# Patient Record
Sex: FEMALE | NOT HISPANIC OR LATINO | Employment: STUDENT | URBAN - METROPOLITAN AREA
[De-identification: names, ages, dates, MRNs, and addresses within clinical notes are randomized per-mention and may not be internally consistent; named-entity substitution may affect disease eponyms.]

---

## 2017-02-06 ENCOUNTER — NON-PROVIDER VISIT (OUTPATIENT)
Dept: URGENT CARE | Facility: CLINIC | Age: 22
End: 2017-02-06

## 2017-02-06 DIAGNOSIS — Z00.8 HEALTH EXAMINATION IN POPULATION SURVEY: ICD-10-CM

## 2017-02-06 DIAGNOSIS — Z02.1 PRE-EMPLOYMENT DRUG SCREENING: ICD-10-CM

## 2017-02-06 LAB
AMP AMPHETAMINE: NORMAL
COC COCAINE: NORMAL
INT CON NEG: NORMAL
INT CON POS: NORMAL
MET METHAMPHETAMINES: NORMAL
OPI OPIATES: NORMAL
PCP PHENCYCLIDINE: NORMAL
POC DRUG COMMENT 753798-OCCUPATIONAL HEALTH: NORMAL
THC: NORMAL

## 2017-02-06 PROCEDURE — 80305 DRUG TEST PRSMV DIR OPT OBS: CPT | Performed by: PHYSICIAN ASSISTANT

## 2017-07-11 ENCOUNTER — OFFICE VISIT (OUTPATIENT)
Dept: URGENT CARE | Facility: CLINIC | Age: 22
End: 2017-07-11
Payer: COMMERCIAL

## 2017-07-11 VITALS
RESPIRATION RATE: 16 BRPM | DIASTOLIC BLOOD PRESSURE: 82 MMHG | OXYGEN SATURATION: 95 % | SYSTOLIC BLOOD PRESSURE: 118 MMHG | TEMPERATURE: 98.1 F | HEART RATE: 102 BPM | WEIGHT: 180 LBS | HEIGHT: 64 IN | BODY MASS INDEX: 30.73 KG/M2

## 2017-07-11 DIAGNOSIS — J02.9 EXUDATIVE PHARYNGITIS: ICD-10-CM

## 2017-07-11 LAB
INT CON NEG: NEGATIVE
INT CON POS: POSITIVE
S PYO AG THROAT QL: NORMAL

## 2017-07-11 PROCEDURE — 99214 OFFICE O/P EST MOD 30 MIN: CPT | Performed by: NURSE PRACTITIONER

## 2017-07-11 PROCEDURE — 87880 STREP A ASSAY W/OPTIC: CPT | Performed by: NURSE PRACTITIONER

## 2017-07-11 RX ORDER — AMOXICILLIN 400 MG/5ML
500 POWDER, FOR SUSPENSION ORAL 2 TIMES DAILY
Qty: 126 ML | Refills: 0 | Status: SHIPPED | OUTPATIENT
Start: 2017-07-11 | End: 2017-07-21

## 2017-07-11 ASSESSMENT — ENCOUNTER SYMPTOMS
SORE THROAT: 1
HEADACHES: 0
CHILLS: 0
VOMITING: 0
NAUSEA: 0
MYALGIAS: 1
SHORTNESS OF BREATH: 0
WEAKNESS: 0
SWOLLEN GLANDS: 1
SPUTUM PRODUCTION: 0
DIARRHEA: 0
COUGH: 0
STRIDOR: 0
FEVER: 1
TROUBLE SWALLOWING: 1

## 2017-07-11 NOTE — MR AVS SNAPSHOT
"Sharmin Duval   2017 9:00 AM   Office Visit   MRN: [id number]    Department:  [facility]   Dept Phone:  [phone]    Description:  Female : 1995   Provider:  LILA Smiley           Reason for Visit     Pharyngitis hurts to swallow and white spots      Allergies as of 2017     Allergen Noted Reactions    No Known Drug Allergy 2014       Vicodin [Hydrocodone-Acetaminophen] 2017       \"kept passing out\"      You were diagnosed with     Exudative pharyngitis   [517954]         Vital Signs     Blood Pressure Pulse Temperature Respirations Height Weight    118/82 mmHg 102 36.7 °C (98.1 °F) 16 1.626 m (5' 4\") 81.647 kg (180 lb)    Body Mass Index Oxygen Saturation Breastfeeding? Smoking Status          30.88 kg/m2 95% No Never Smoker         Basic Information     Date Of Birth Sex Race Ethnicity Preferred Language    1995 Female White Non- English      Problem List              ICD-10-CM Priority Class Noted - Resolved    Seasonal allergies J30.2   2014 - Present    Thalassemia minor D56.3   Unknown - Present    Sinus pain J34.89   6/10/2015 - Present    Lump in neck R22.1   10/21/2015 - Present    Anxiety F41.9   2016 - Present    Serum gamma globulin increased D89.2   2016 - Present    Vitamin D deficiency E55.9   2016 - Present      Health Maintenance        Date Due Completion Dates    IMM PNEUMOCOCCAL 19-64 (ADULT) HIGHEST RISK SERIES (1 of 3 - PCV13) 10/11/2012 ---    IMM INFLUENZA (1) 2017 10/29/2015    PAP SMEAR 6/10/2018 6/10/2015 (Postponed)    Override on 6/10/2015: Postponed (never sexually active)    IMM DTaP/Tdap/Td Vaccine (7 - Td) 6/10/2025 6/10/2015, 2007, 1997, 1995, 1994, 3/10/1994, 1993            Results     POCT Rapid Strep A      Component    Rapid Strep Screen    NEG    Internal Control Positive    Positive    Internal Control Negative    Negative                        "   Current Immunizations     Dtap Vaccine 9/19/1997, 2/1/1995, 5/19/1994, 3/10/1994, 1993    HPV 9-VALENT VACCINE (GARDASIL 9) 10/23/2006    HPV Quadrivalent Vaccine (GARDASIL) 6/22/2006, 4/24/2006    Hepatitis A Vaccine, Ped/Adol 11/5/2003, 4/22/2003    Hepatitis B Vaccine Non-Recombivax (Ped/Adol) 11/5/2003, 6/2/2003, 4/22/2003    Hib Vaccine (Prp-d) Historical Data 2/1/1995, 5/19/1994, 3/10/1994, 1993    IPV 2/1/1995, 5/19/1994, 3/10/1994, 1993    Influenza Vaccine Quad Inj (Preserved) 10/29/2015    MMR Vaccine 9/19/1997, 2/1/1995    Meningococcal Conjugate Vaccine MCV4 (Menactra) 4/16/2007    OPV - Historical Data 9/19/1997    Tdap Vaccine 6/10/2015, 4/16/2007    Varicella Vaccine Live 10/14/1996, 1/1/1995      Below and/or attached are the medications your provider expects you to take. Review all of your home medications and newly ordered medications with your provider and/or pharmacist. Follow medication instructions as directed by your provider and/or pharmacist. Please keep your medication list with you and share with your provider. Update the information when medications are discontinued, doses are changed, or new medications (including over-the-counter products) are added; and carry medication information at all times in the event of emergency situations     Allergies:  NO KNOWN DRUG ALLERGY - (reactions not documented)     VICODIN - (reactions not documented)               Medications  Valid as of: July 11, 2017 -  1:37 PM    Generic Name Brand Name Tablet Size Instructions for use    Amoxicillin (Recon Susp) AMOXIL 400 MG/5ML Take 6.3 mL by mouth 2 times a day for 10 days.        Ergocalciferol (Cap) DRISDOL 99352 UNITS Take 1 Cap by mouth every 7 days.        Fluticasone Propionate (Suspension) FLONASE 50 MCG/ACT Spray 1 Spray in nose every day.        Loratadine (Tab) CLARITIN 10 MG Take one tablet by mouth one time daily        Norethin-Eth Estrad-Fe Biphas   Take  by mouth.        .                  Medicines prescribed today were sent to:     [id number] [place] [facility] [place] - [address]    [address] [place] [address]    Phone: [phone] Fax: [phone]    Open 24 Hours?: No      Medication refill instructions:       If your prescription bottle indicates you have medication refills left, it is not necessary to call your provider’s office. Please contact your pharmacy and they will refill your medication.    If your prescription bottle indicates you do not have any refills left, you may request refills at any time through one of the following ways: The online [employer] Mail system (except Urgent Care), by calling your provider’s office, or by asking your pharmacy to contact your provider’s office with a refill request. Medication refills are processed only during regular business hours and may not be available until the next business day. Your provider may request additional information or to have a follow-up visit with you prior to refilling your medication.   *Please Note: Medication refills are assigned a new Rx number when refilled electronically. Your pharmacy may indicate that no refills were authorized even though a new prescription for the same medication is available at the pharmacy. Please request the medicine by name with the pharmacy before contacting your provider for a refill.        Instructions    Pharyngitis  Pharyngitis is redness, pain, and swelling (inflammation) of your pharynx.   CAUSES   Pharyngitis is usually caused by infection. Most of the time, these infections are from viruses (viral) and are part of a cold. However, sometimes pharyngitis is caused by bacteria (bacterial). Pharyngitis can also be caused by allergies. Viral pharyngitis may be spread from person to person by coughing, sneezing, and personal items or utensils (cups, forks, spoons, toothbrushes). Bacterial pharyngitis may be spread from person to person by more intimate contact, such as  kissing.   SIGNS AND SYMPTOMS   Symptoms of pharyngitis include:    · Sore throat.    · Tiredness (fatigue).    · Low-grade fever.    · Headache.  · Joint pain and muscle aches.  · Skin rashes.  · Swollen lymph nodes.  · Plaque-like film on throat or tonsils (often seen with bacterial pharyngitis).  DIAGNOSIS   Your health care provider will ask you questions about your illness and your symptoms. Your medical history, along with a physical exam, is often all that is needed to diagnose pharyngitis. Sometimes, a rapid strep test is done. Other lab tests may also be done, depending on the suspected cause.   TREATMENT   Viral pharyngitis will usually get better in 3-4 days without the use of medicine. Bacterial pharyngitis is treated with medicines that kill germs (antibiotics).   HOME CARE INSTRUCTIONS   · Drink enough water and fluids to keep your urine clear or pale yellow.    · Only take over-the-counter or prescription medicines as directed by your health care provider:    ¨ If you are prescribed antibiotics, make sure you finish them even if you start to feel better.    ¨ Do not take aspirin.    · Get lots of rest.    · Gargle with 8 oz of salt water (½ tsp of salt per 1 qt of water) as often as every 1-2 hours to soothe your throat.    · Throat lozenges (if you are not at risk for choking) or sprays may be used to soothe your throat.  SEEK MEDICAL CARE IF:   · You have large, tender lumps in your neck.  · You have a rash.  · You cough up green, yellow-brown, or bloody spit.  SEEK IMMEDIATE MEDICAL CARE IF:   · Your neck becomes stiff.  · You drool or are unable to swallow liquids.  · You vomit or are unable to keep medicines or liquids down.  · You have severe pain that does not go away with the use of recommended medicines.  · You have trouble breathing (not caused by a stuffy nose).  MAKE SURE YOU:   · Understand these instructions.  · Will watch your condition.  · Will get help right away if you are not doing  well or get worse.     This information is not intended to replace advice given to you by your health care provider. Make sure you discuss any questions you have with your health care provider.     Document Released: 12/18/2006 Document Revised: 10/08/2014 Document Reviewed: 08/25/2014  [employer] Interactive Patient Education ©2016 [employer]            [employer] Access Code: Activation code not generated  Current Online Agility Status: Active

## 2017-07-11 NOTE — PROGRESS NOTES
"Subjective:      Sharmin Duval is a 21 y.o. female who presents with Pharyngitis            HPI Comments: Medications, Allergies and Prior Medical Hx reviewed and updated in [facility].with patient/family today         Pharyngitis   This is a new problem. The current episode started yesterday. The problem has been gradually worsening. The pain is worse on the left side. Maximum temperature: subjective fever. The pain is moderate. Associated symptoms include swollen glands and trouble swallowing. Pertinent negatives include no congestion, coughing, diarrhea, ear discharge, ear pain, headaches, shortness of breath, stridor or vomiting. She has had exposure to strep. She has tried nothing for the symptoms. The treatment provided no relief.       Review of Systems   Constitutional: Positive for fever and malaise/fatigue. Negative for chills.   HENT: Positive for sore throat and trouble swallowing. Negative for congestion, ear discharge and ear pain.    Respiratory: Negative for cough, sputum production, shortness of breath and stridor.    Gastrointestinal: Negative for nausea, vomiting and diarrhea.   Musculoskeletal: Positive for myalgias.   Neurological: Negative for weakness and headaches.          Objective:     /82 mmHg  Pulse 102  Temp(Src) 36.7 °C (98.1 °F)  Resp 16  Ht 1.626 m (5' 4\")  Wt 81.647 kg (180 lb)  BMI 30.88 kg/m2  SpO2 95%  Breastfeeding? No     Physical Exam   Constitutional: She appears well-developed and well-nourished. No distress.   HENT:   Head: Normocephalic and atraumatic.   Right Ear: Tympanic membrane and ear canal normal.   Left Ear: Tympanic membrane and ear canal normal.   Nose: No rhinorrhea.   Mouth/Throat: Uvula is midline and mucous membranes are normal. No trismus in the jaw. No uvula swelling. Oropharyngeal exudate, posterior oropharyngeal edema and posterior oropharyngeal erythema present.   Eyes: Conjunctivae are normal. Pupils are equal, round, and reactive to light. "   Neck: Neck supple.   Cardiovascular: Normal rate, regular rhythm and normal heart sounds.    Pulmonary/Chest: Effort normal and breath sounds normal. No respiratory distress. She has no wheezes.   Lymphadenopathy:     She has cervical adenopathy.   Neurological: She is alert.   Skin: Skin is warm and dry.   Psychiatric: She has a normal mood and affect. Her behavior is normal.   Vitals reviewed.              Assessment/Plan:       1. Exudative pharyngitis  POCT Rapid Strep A    amoxicillin (AMOXIL) 400 MG/5ML suspension         poct strep - neg    Pt sx are compatible with strep, she has been exposed to strep discussed with pt she agrees to treat for strep.     Letter written for 2 days off of school/work    Epic generated written imformation provided along with verbal instructions for pharyngitis    Rest, Fluids, tylenol, ibuprofen, otc throat lozenges, gargle with warm salt water,   Pt will go to the ER for worsening or changing symptoms as discussed,  Follow-up with your primary care provider or return here if not improving in 5 days   Discharge instructions discussed with pt/family who verbalize understanding and agreement with poc

## 2017-07-11 NOTE — PATIENT INSTRUCTIONS

## 2018-10-29 ENCOUNTER — IMMUNIZATION (OUTPATIENT)
Dept: SOCIAL WORK | Facility: CLINIC | Age: 23
End: 2018-10-29
Payer: COMMERCIAL

## 2018-10-29 DIAGNOSIS — Z23 NEED FOR VACCINATION: ICD-10-CM

## 2018-10-29 PROCEDURE — 90471 IMMUNIZATION ADMIN: CPT | Performed by: REGISTERED NURSE

## 2018-10-29 PROCEDURE — 90686 IIV4 VACC NO PRSV 0.5 ML IM: CPT | Performed by: REGISTERED NURSE

## 2019-03-13 ENCOUNTER — OFFICE VISIT (OUTPATIENT)
Dept: MEDICAL GROUP | Facility: LAB | Age: 24
End: 2019-03-13
Payer: COMMERCIAL

## 2019-03-13 VITALS
HEIGHT: 64 IN | TEMPERATURE: 98 F | RESPIRATION RATE: 20 BRPM | OXYGEN SATURATION: 93 % | WEIGHT: 197.09 LBS | BODY MASS INDEX: 33.65 KG/M2 | HEART RATE: 113 BPM | DIASTOLIC BLOOD PRESSURE: 68 MMHG | SYSTOLIC BLOOD PRESSURE: 126 MMHG

## 2019-03-13 DIAGNOSIS — D56.3 THALASSEMIA MINOR: ICD-10-CM

## 2019-03-13 DIAGNOSIS — R78.89 FINDING OF OTHER SPECIFIED SUBSTANCES, NOT NORMALLY FOUND IN BLOOD: ICD-10-CM

## 2019-03-13 DIAGNOSIS — F41.1 GAD (GENERALIZED ANXIETY DISORDER): ICD-10-CM

## 2019-03-13 DIAGNOSIS — J30.2 SEASONAL ALLERGIES: ICD-10-CM

## 2019-03-13 PROCEDURE — 99204 OFFICE O/P NEW MOD 45 MIN: CPT | Performed by: FAMILY MEDICINE

## 2019-03-13 ASSESSMENT — PATIENT HEALTH QUESTIONNAIRE - PHQ9: CLINICAL INTERPRETATION OF PHQ2 SCORE: 0

## 2019-03-13 NOTE — PATIENT INSTRUCTIONS
Generalized Anxiety Disorder  Generalized anxiety disorder (ISABELLE) is a mental disorder. It interferes with life functions, including relationships, work, and school.  ISABELLE is different from normal anxiety, which everyone experiences at some point in their lives in response to specific life events and activities. Normal anxiety actually helps us prepare for and get through these life events and activities. Normal anxiety goes away after the event or activity is over.   ISABELLE causes anxiety that is not necessarily related to specific events or activities. It also causes excess anxiety in proportion to specific events or activities. The anxiety associated with ISABELLE is also difficult to control. ISABELLE can vary from mild to severe. People with severe ISABELLE can have intense waves of anxiety with physical symptoms (panic attacks).   SYMPTOMS  The anxiety and worry associated with ISABELLE are difficult to control. This anxiety and worry are related to many life events and activities and also occur more days than not for 6 months or longer. People with ISABELLE also have three or more of the following symptoms (one or more in children):  · Restlessness.    · Fatigue.  · Difficulty concentrating.    · Irritability.  · Muscle tension.  · Difficulty sleeping or unsatisfying sleep.  DIAGNOSIS  ISABELLE is diagnosed through an assessment by your health care provider. Your health care provider will ask you questions about your mood, physical symptoms, and events in your life. Your health care provider may ask you about your medical history and use of alcohol or drugs, including prescription medicines. Your health care provider may also do a physical exam and blood tests. Certain medical conditions and the use of certain substances can cause symptoms similar to those associated with ISABELLE. Your health care provider may refer you to a mental health specialist for further evaluation.  TREATMENT  The following therapies are usually used to treat ISABELLE:    · Medication. Antidepressant medication usually is prescribed for long-term daily control. Antianxiety medicines may be added in severe cases, especially when panic attacks occur.    · Talk therapy (psychotherapy). Certain types of talk therapy can be helpful in treating ISABELLE by providing support, education, and guidance. A form of talk therapy called cognitive behavioral therapy can teach you healthy ways to think about and react to daily life events and activities.  · Stress management techniques. These include yoga, meditation, and exercise and can be very helpful when they are practiced regularly.  A mental health specialist can help determine which treatment is best for you. Some people see improvement with one therapy. However, other people require a combination of therapies.  This information is not intended to replace advice given to you by your health care provider. Make sure you discuss any questions you have with your health care provider.  Document Released: 04/14/2014 Document Revised: 01/08/2016 Document Reviewed: 04/14/2014  [employer] Interactive Patient Education © 2017 [employer]

## 2019-03-13 NOTE — ASSESSMENT & PLAN NOTE
Saw a doctor for her anxiety and they did a lot of labs and was told she had cancer which she saw a hematologist and she does not have cancer.  She see's Dr. Sarah Cardona with [facility]

## 2019-03-13 NOTE — PROGRESS NOTES
Chief Complaint   Patient presents with   • Establish Care         Sharmin Mir is a 23 y.o. female here to establish care and for evaluation and management of:        HPI:    Seasonal allergies  More in the Spring with dry eyes and congestion.    IASBELLE (generalized anxiety disorder)  Saw a doctor for her anxiety and they did a lot of labs and was told she had cancer which she saw a hematologist and she does not have cancer.  She see's Dr. Sarah Card[facility] with [facility]    Patient is followed by Dr. Cardona at [facility].  She was originally referred for a possible multiple myeloma but this was found to be inaccurate.  She is followed annually.  We are waiting to get her old labs to see exactly what condition she has.  She has been doing well but this scare has increased her overall anxiety and she would like to see a psychologist.    Allergies   Allergen Reactions   • Latex Rash     Rash   • Vicodin [Hydrocodone-Acetaminophen]      Pt states she passed out after taking the medication       Current medicines (including changes today)  No current outpatient prescriptions on file.     No current facility-administered medications for this visit.      She  has a past medical history of Allergy; Anxiety; and Thalassemia minor (3/13/2019).  She  has a past surgical history that includes dental extraction(s).  Social History   Substance Use Topics   • Smoking status: Never Smoker   • Smokeless tobacco: Never Used   • Alcohol use No     Social History     Social History Narrative   • No narrative on file     Family History   Problem Relation Age of Onset   • Cancer Paternal Aunt 49        Breast   • Cancer Maternal Grandmother 58        colon     Family Status   Relation Status   • Mo Alive   • Fa Alive   • Bro Alive   • PAunt Alive   • MGMo    • MGFa    • PGMo    • PGFa          ROS  No fever or chills.  No nausea or vomiting.  No chest pain or palpitations.  No cough  "or SOB.  No pain with urination or hematuria.  No black or bloody stools.  All other systems reviewed and are negative     Objective:     Blood pressure 126/68, pulse (!) 113, temperature 36.7 °C (98 °F), temperature source Temporal, resp. rate 20, height 1.63 m (5' 4.17\"), weight 89.4 kg (197 lb 1.5 oz), last menstrual period 02/10/2019, SpO2 93 %. Body mass index is 33.65 kg/m².  Physical Exam:      Well developed, well nourished.  Alert, oriented in no acute distress.  Psych: Eye contact is good, speech goal directed, affect calm  Eyes: conjunctiva non-injected, sclera non-icteric.  Neck Supple.  No adenopathy or masses in the neck or supraclavicular regions. No thyromegaly  Lungs: clear to auscultation bilaterally with good excursion. No wheezes or rhonchi  CV: regular rate and rhythm. No murmur  Abdomen: soft, nontender, no masses or organomegaly.  No rebound or guarding  Ext: no edema, color normal, vascularity normal, temperature normal      Assessment and Plan:   The following treatment plan was discussed    1. Thalassemia minor  Reassured.  Follow-up with hematology  - REFERRAL TO HEMATOLOGY ONCOLOGY Referral to? [facility]    2. Seasonal allergies  Symptomatic care.  Discussed avoidance and using OTC medications like Allegra or Claritin    3. ISABELLE (generalized anxiety disorder)  Refer to psychology for further evaluation and treatment.  I did discuss mindfulness meditation and martha such as headspace in the science behind this.  - REFERRAL TO PSYCHOLOGY    4. Finding of other specified substances, not normally found in blood  Refer to Dr. Cardona for further evaluation and treatment.  We will get a copy of those records for her chart.  - REFERRAL TO HEMATOLOGY ONCOLOGY Referral to? [facility]      Records requested.    Any change or worsening of signs or symptoms, patient encouraged to follow-up or report to the emergency room for further evaluation. Patient understands and " agrees.    Followup: Return in about 6 months (around 9/13/2019).

## 2019-04-18 ENCOUNTER — APPOINTMENT (OUTPATIENT)
Dept: LAB | Facility: MEDICAL CENTER | Age: 24
End: 2019-04-18
Attending: INTERNAL MEDICINE
Payer: COMMERCIAL

## 2019-04-19 ENCOUNTER — HOSPITAL ENCOUNTER (OUTPATIENT)
Dept: LAB | Facility: MEDICAL CENTER | Age: 24
End: 2019-04-19
Attending: INTERNAL MEDICINE
Payer: COMMERCIAL

## 2019-04-19 LAB
ALBUMIN SERPL BCP-MCNC: 4.3 G/DL (ref 3.2–4.9)
ALBUMIN/GLOB SERPL: 1.2 G/DL
ALP SERPL-CCNC: 71 U/L (ref 30–99)
ALT SERPL-CCNC: 16 U/L (ref 2–50)
ANION GAP SERPL CALC-SCNC: 9 MMOL/L (ref 0–11.9)
AST SERPL-CCNC: 17 U/L (ref 12–45)
BILIRUB SERPL-MCNC: 0.7 MG/DL (ref 0.1–1.5)
BUN SERPL-MCNC: 12 MG/DL (ref 8–22)
CALCIUM SERPL-MCNC: 9.4 MG/DL (ref 8.5–10.5)
CHLORIDE SERPL-SCNC: 104 MMOL/L (ref 96–112)
CO2 SERPL-SCNC: 24 MMOL/L (ref 20–33)
CREAT SERPL-MCNC: 0.66 MG/DL (ref 0.5–1.4)
FASTING STATUS PATIENT QL REPORTED: NORMAL
GLOBULIN SER CALC-MCNC: 3.6 G/DL (ref 1.9–3.5)
GLUCOSE SERPL-MCNC: 90 MG/DL (ref 65–99)
POTASSIUM SERPL-SCNC: 3.7 MMOL/L (ref 3.6–5.5)
PROT SERPL-MCNC: 7.9 G/DL (ref 6–8.2)
SODIUM SERPL-SCNC: 137 MMOL/L (ref 135–145)

## 2019-04-19 PROCEDURE — 82784 ASSAY IGA/IGD/IGG/IGM EACH: CPT

## 2019-04-19 PROCEDURE — 82232 ASSAY OF BETA-2 PROTEIN: CPT

## 2019-04-19 PROCEDURE — 84160 ASSAY OF PROTEIN ANY SOURCE: CPT

## 2019-04-19 PROCEDURE — 84156 ASSAY OF PROTEIN URINE: CPT

## 2019-04-19 PROCEDURE — 80053 COMPREHEN METABOLIC PANEL: CPT

## 2019-04-19 PROCEDURE — 83883 ASSAY NEPHELOMETRY NOT SPEC: CPT | Mod: 91

## 2019-04-19 PROCEDURE — 36415 COLL VENOUS BLD VENIPUNCTURE: CPT

## 2019-04-19 PROCEDURE — 85025 COMPLETE CBC W/AUTO DIFF WBC: CPT

## 2019-04-19 PROCEDURE — 84165 PROTEIN E-PHORESIS SERUM: CPT

## 2019-04-21 LAB
B2 MICROGLOB SERPL-MCNC: 1.3 MG/L (ref 1.1–2.4)
IGA SERPL-MCNC: 131 MG/DL (ref 68–408)
IGG SERPL-MCNC: 1600 MG/DL (ref 768–1632)
IGM SERPL-MCNC: 111 MG/DL (ref 35–263)

## 2019-04-22 LAB
ALBUMIN 24H UR QL ELPH: DETECTED
ALBUMIN SERPL-MCNC: 4.47 G/DL (ref 3.75–5.01)
ALPHA1 GLOB 24H UR QL ELPH: DETECTED
ALPHA1 GLOB SERPL ELPH-MCNC: 0.29 G/DL (ref 0.19–0.46)
ALPHA2 GLOB 24H UR QL ELPH: DETECTED
ALPHA2 GLOB SERPL ELPH-MCNC: 0.58 G/DL (ref 0.48–1.05)
B-GLOBULIN SERPL ELPH-MCNC: 0.81 G/DL (ref 0.48–1.1)
B-GLOBULIN UR QL ELPH: DETECTED
COLLECT DURATION TIME SPEC: ABNORMAL H
EER PROT ELECT SER Q1092: ABNORMAL
GAMMA GLOB SERPL ELPH-MCNC: 1.85 G/DL (ref 0.62–1.51)
GAMMA GLOB UR QL ELPH: DETECTED
INTERPRETATION SERPL IFE-IMP: ABNORMAL
INTERPRETATION UR IFE-IMP: ABNORMAL
KAPPA LC FREE SER-MCNC: 1.88 MG/DL (ref 0.33–1.94)
KAPPA LC FREE UR-MCNC: 2.1 MG/DL (ref 0.14–2.42)
KAPPA LC FREE/LAMBDA FREE SER NEPH: 1.59 {RATIO} (ref 0.26–1.65)
KAPPA LC FREE/LAMBDA FREE UR: 17.5 RATIO (ref 2.04–10.37)
LAMBDA LC FREE SERPL-MCNC: 1.18 MG/DL (ref 0.57–2.63)
LAMBDA LC FREE UR-MCNC: 0.12 MG/DL (ref 0.02–0.67)
PROT 24H UR-MRATE: ABNORMAL MG/D (ref 10–140)
PROT SERPL-MCNC: 8 G/DL (ref 6–8.3)
TOTAL VOLUME 1105: ABNORMAL ML

## 2019-09-04 ENCOUNTER — HOSPITAL ENCOUNTER (OUTPATIENT)
Dept: LAB | Facility: MEDICAL CENTER | Age: 24
End: 2019-09-04
Attending: SPECIALIST
Payer: COMMERCIAL

## 2019-09-04 PROCEDURE — 87491 CHLMYD TRACH DNA AMP PROBE: CPT

## 2019-09-04 PROCEDURE — 87591 N.GONORRHOEAE DNA AMP PROB: CPT

## 2019-09-05 LAB
C TRACH DNA SPEC QL NAA+PROBE: NEGATIVE
N GONORRHOEA DNA SPEC QL NAA+PROBE: NEGATIVE
SPECIMEN SOURCE: NORMAL

## 2019-10-30 ENCOUNTER — IMMUNIZATION (OUTPATIENT)
Dept: SOCIAL WORK | Facility: CLINIC | Age: 24
End: 2019-10-30
Payer: COMMERCIAL

## 2019-10-30 DIAGNOSIS — Z23 NEED FOR VACCINATION: ICD-10-CM

## 2019-10-30 PROCEDURE — 90686 IIV4 VACC NO PRSV 0.5 ML IM: CPT | Performed by: REGISTERED NURSE

## 2019-10-30 PROCEDURE — 90471 IMMUNIZATION ADMIN: CPT | Performed by: REGISTERED NURSE

## 2024-01-08 ENCOUNTER — HOSPITAL ENCOUNTER (OUTPATIENT)
Facility: MEDICAL CENTER | Age: 29
End: 2024-01-08
Attending: PREVENTIVE MEDICINE
Payer: COMMERCIAL

## 2024-01-08 ENCOUNTER — EMPLOYEE HEALTH (OUTPATIENT)
Dept: OCCUPATIONAL MEDICINE | Facility: CLINIC | Age: 29
End: 2024-01-08

## 2024-01-08 ENCOUNTER — EH NON-PROVIDER (OUTPATIENT)
Dept: OCCUPATIONAL MEDICINE | Facility: CLINIC | Age: 29
End: 2024-01-08

## 2024-01-08 DIAGNOSIS — Z02.89 VISIT FOR OCCUPATIONAL HEALTH EXAMINATION: ICD-10-CM

## 2024-01-08 DIAGNOSIS — Z02.89 VISIT FOR OCCUPATIONAL HEALTH EXAMINATION: Primary | ICD-10-CM

## 2024-01-08 LAB
AMP AMPHETAMINE: NORMAL
BAR BARBITURATES: NORMAL
BZO BENZODIAZEPINES: NORMAL
COC COCAINE: NORMAL
INT CON NEG: NORMAL
INT CON POS: NORMAL
MDMA ECSTASY: NORMAL
MET METHAMPHETAMINES: NORMAL
MTD METHADONE: NORMAL
OPI OPIATES: NORMAL
OXY OXYCODONE: NORMAL
PCP PHENCYCLIDINE: NORMAL
POC URINE DRUG SCREEN OCDRS: NORMAL
THC: NORMAL

## 2024-01-08 PROCEDURE — 8915 PR COMPREHENSIVE PHYSICAL: Performed by: PREVENTIVE MEDICINE

## 2024-01-08 PROCEDURE — 80305 DRUG TEST PRSMV DIR OPT OBS: CPT | Performed by: PREVENTIVE MEDICINE

## 2024-01-08 PROCEDURE — 86480 TB TEST CELL IMMUN MEASURE: CPT | Performed by: PREVENTIVE MEDICINE

## 2024-01-08 NOTE — PROGRESS NOTES
Pre Employment Drug Screen Completed.   No mask fit required due to REGGIE and Job position.  Docs: Hep B, MMR, VZV, 2015 Tdap  Declined Flu & COVID

## 2024-01-09 LAB
GAMMA INTERFERON BACKGROUND BLD IA-ACNC: 0.07 IU/ML
M TB IFN-G BLD-IMP: NEGATIVE
M TB IFN-G CD4+ BCKGRND COR BLD-ACNC: 0.01 IU/ML
MITOGEN IGNF BCKGRD COR BLD-ACNC: >10 IU/ML
QFT TB2 - NIL TBQ2: 0.02 IU/ML

## 2024-12-01 ENCOUNTER — APPOINTMENT (OUTPATIENT)
Dept: RADIOLOGY | Facility: MEDICAL CENTER | Age: 29
End: 2024-12-01
Attending: EMERGENCY MEDICINE
Payer: COMMERCIAL

## 2024-12-01 ENCOUNTER — HOSPITAL ENCOUNTER (EMERGENCY)
Facility: MEDICAL CENTER | Age: 29
End: 2024-12-01
Attending: EMERGENCY MEDICINE
Payer: COMMERCIAL

## 2024-12-01 VITALS
TEMPERATURE: 97.9 F | HEART RATE: 110 BPM | RESPIRATION RATE: 16 BRPM | OXYGEN SATURATION: 95 % | DIASTOLIC BLOOD PRESSURE: 84 MMHG | SYSTOLIC BLOOD PRESSURE: 137 MMHG

## 2024-12-01 DIAGNOSIS — H53.9 VISION DISTURBANCE: ICD-10-CM

## 2024-12-01 LAB
ANION GAP SERPL CALC-SCNC: 16 MMOL/L (ref 7–16)
BASOPHILS # BLD AUTO: 0.2 % (ref 0–1.8)
BASOPHILS # BLD: 0.03 K/UL (ref 0–0.12)
BUN SERPL-MCNC: 8 MG/DL (ref 8–22)
CALCIUM SERPL-MCNC: 9.6 MG/DL (ref 8.5–10.5)
CHLORIDE SERPL-SCNC: 104 MMOL/L (ref 96–112)
CO2 SERPL-SCNC: 19 MMOL/L (ref 20–33)
CREAT SERPL-MCNC: 0.65 MG/DL (ref 0.5–1.4)
EKG IMPRESSION: NORMAL
EOSINOPHIL # BLD AUTO: 0.34 K/UL (ref 0–0.51)
EOSINOPHIL NFR BLD: 2.7 % (ref 0–6.9)
ERYTHROCYTE [DISTWIDTH] IN BLOOD BY AUTOMATED COUNT: 34.3 FL (ref 35.9–50)
GFR SERPLBLD CREATININE-BSD FMLA CKD-EPI: 121 ML/MIN/1.73 M 2
GLUCOSE SERPL-MCNC: 100 MG/DL (ref 65–99)
HCG SERPL QL: NEGATIVE
HCT VFR BLD AUTO: 38.2 % (ref 37–47)
HGB BLD-MCNC: 12.2 G/DL (ref 12–16)
IMM GRANULOCYTES # BLD AUTO: 0.06 K/UL (ref 0–0.11)
IMM GRANULOCYTES NFR BLD AUTO: 0.5 % (ref 0–0.9)
LYMPHOCYTES # BLD AUTO: 2.56 K/UL (ref 1–4.8)
LYMPHOCYTES NFR BLD: 20.6 % (ref 22–41)
MCH RBC QN AUTO: 20.2 PG (ref 27–33)
MCHC RBC AUTO-ENTMCNC: 31.9 G/DL (ref 32.2–35.5)
MCV RBC AUTO: 63.2 FL (ref 81.4–97.8)
MONOCYTES # BLD AUTO: 0.56 K/UL (ref 0–0.85)
MONOCYTES NFR BLD AUTO: 4.5 % (ref 0–13.4)
NEUTROPHILS # BLD AUTO: 8.86 K/UL (ref 1.82–7.42)
NEUTROPHILS NFR BLD: 71.5 % (ref 44–72)
NRBC # BLD AUTO: 0 K/UL
NRBC BLD-RTO: 0 /100 WBC (ref 0–0.2)
PLATELET # BLD AUTO: 345 K/UL (ref 164–446)
PMV BLD AUTO: 9.1 FL (ref 9–12.9)
POTASSIUM SERPL-SCNC: 3.8 MMOL/L (ref 3.6–5.5)
RBC # BLD AUTO: 6.04 M/UL (ref 4.2–5.4)
SODIUM SERPL-SCNC: 139 MMOL/L (ref 135–145)
WBC # BLD AUTO: 12.4 K/UL (ref 4.8–10.8)

## 2024-12-01 PROCEDURE — 700102 HCHG RX REV CODE 250 W/ 637 OVERRIDE(OP): Performed by: EMERGENCY MEDICINE

## 2024-12-01 PROCEDURE — 85025 COMPLETE CBC W/AUTO DIFF WBC: CPT

## 2024-12-01 PROCEDURE — 80048 BASIC METABOLIC PNL TOTAL CA: CPT

## 2024-12-01 PROCEDURE — A9270 NON-COVERED ITEM OR SERVICE: HCPCS | Performed by: EMERGENCY MEDICINE

## 2024-12-01 PROCEDURE — 36415 COLL VENOUS BLD VENIPUNCTURE: CPT

## 2024-12-01 PROCEDURE — 84703 CHORIONIC GONADOTROPIN ASSAY: CPT

## 2024-12-01 PROCEDURE — 70450 CT HEAD/BRAIN W/O DYE: CPT

## 2024-12-01 PROCEDURE — 99283 EMERGENCY DEPT VISIT LOW MDM: CPT

## 2024-12-01 PROCEDURE — 93005 ELECTROCARDIOGRAM TRACING: CPT | Performed by: EMERGENCY MEDICINE

## 2024-12-01 RX ORDER — IBUPROFEN 600 MG/1
600 TABLET, FILM COATED ORAL ONCE
Status: COMPLETED | OUTPATIENT
Start: 2024-12-01 | End: 2024-12-01

## 2024-12-01 RX ADMIN — IBUPROFEN 600 MG: 600 TABLET, FILM COATED ORAL at 16:18

## 2024-12-01 NOTE — ED NOTES
Visual acuity performed.     R eye: 20/20 -2  L eye: 20/30 -2  Both eyes: 20/15 -1    Patient wears corrective lenses at baseline, not wearing corrective lenses during visual acuity exam.

## 2024-12-01 NOTE — ED TRIAGE NOTES
.  Chief Complaint   Patient presents with    Eye Pain     Patient states that while she was working she got blurred vision to upper portion of her right eye and then had blurred vision/floaters to peripheral vision, systems have stopped, denied any headache or dizziness, states she had a normal eye exam a fe months prior       .BP (!) 141/95   Pulse (!) 115   Temp 36.7 °C (98 °F) (Temporal)   Resp 18   SpO2 99%

## 2024-12-01 NOTE — ED PROVIDER NOTES
ED PHYSICIAN NOTE    CHIEF COMPLAINT  Chief Complaint   Patient presents with    Eye Pain     Patient states that while she was working she got blurred vision to upper portion of her right eye and then had blurred vision/floaters to peripheral vision, systems have stopped, denied any headache or dizziness, states she had a normal eye exam a fe months prior         EXTERNAL RECORDS REVIEWED  Outpatient Notes patient has a history of generalized anxiety disorder, thalassemia minor.  Followed by hematology..    PIOTR/BECCA      Sharmin Mir is a 29 y.o. female who presents with transient vision disturbance.  Patient had been working on a computer.  She was not wearing her glasses.  She does suffer from the eyestrain.  She started to see some floaters in her upper visual field in both eyes.  This lasted for about 20 minutes and then migrated to only be the right lateral visual field in both eyes.  This lasted for another 30 to 40 seconds and then went away.  She no longer has any vision disturbance.  She never had any weakness numbness neurologic symptoms.  No slurred speech confusion.  She suffers from anxiety and this did worsen her anxiety.  She has not developed a headache afterwards.  She has never had anything like this before.  Reports she has astigmatism and saw an ophthalmologist a couple months ago with a normal ocular examination.    PAST MEDICAL HISTORY  Past Medical History:   Diagnosis Date    Allergy     Anxiety     Seasonal allergies 5/28/2014    Thalassemia minor     Thalassemia minor 3/13/2019       SOCIAL HISTORY  Social History     Tobacco Use    Smoking status: Never    Smokeless tobacco: Never   Substance Use Topics    Alcohol use: No    Drug use: No       CURRENT MEDICATIONS  Home Medications       Reviewed by Smita Duval R.N. (Registered Nurse) on 12/01/24 at 1439  Med List Status: Not Addressed     Medication Last Dose Status   fluticasone (FLONASE) 50 MCG/ACT nasal spray  Active  "  loratadine (CLARITIN) 10 MG TABS  Active   Norethin-Eth Estrad-Fe Biphas (LO LOESTRIN FE PO)  Active   vitamin D, Ergocalciferol, (DRISDOL) 30963 UNITS Cap capsule  Active                    ALLERGIES  Allergies   Allergen Reactions    Latex Rash     Rash    No Known Drug Allergy     Vicodin [Hydrocodone-Acetaminophen]      \"kept passing out\"    Vicodin [Hydrocodone-Acetaminophen]      Pt states she passed out after taking the medication       PHYSICAL EXAM  VITAL SIGNS: /84   Pulse (!) 110   Temp 36.6 °C (97.9 °F) (Temporal)   Resp 16   SpO2 95%    Constitutional: Awake and alert  HENT: Normal inspection  Eyes: Pupils equal round reactive to light.  No visual field cut.  EOMI.  Dilated funduscopic exam normal without identified papilledema.  Neck: Grossly normal range of motion.  Cardiovascular: Normal heart rate  Thorax & Lungs: No respiratory distress  Neurologic: Awake alert oriented.  Clear speech.  Moves all extremities symmetrically.  Normal gait  Psychiatric: Anxious    DIAGNOSTIC STUDIES / PROCEDURES  LABS/EKG  Results for orders placed or performed during the hospital encounter of 12/01/24   CBC WITH DIFFERENTIAL    Collection Time: 12/01/24  3:26 PM   Result Value Ref Range    WBC 12.4 (H) 4.8 - 10.8 K/uL    RBC 6.04 (H) 4.20 - 5.40 M/uL    Hemoglobin 12.2 12.0 - 16.0 g/dL    Hematocrit 38.2 37.0 - 47.0 %    MCV 63.2 (L) 81.4 - 97.8 fL    MCH 20.2 (L) 27.0 - 33.0 pg    MCHC 31.9 (L) 32.2 - 35.5 g/dL    RDW 34.3 (L) 35.9 - 50.0 fL    Platelet Count 345 164 - 446 K/uL    MPV 9.1 9.0 - 12.9 fL    Neutrophils-Polys 71.50 44.00 - 72.00 %    Lymphocytes 20.60 (L) 22.00 - 41.00 %    Monocytes 4.50 0.00 - 13.40 %    Eosinophils 2.70 0.00 - 6.90 %    Basophils 0.20 0.00 - 1.80 %    Immature Granulocytes 0.50 0.00 - 0.90 %    Nucleated RBC 0.00 0.00 - 0.20 /100 WBC    Neutrophils (Absolute) 8.86 (H) 1.82 - 7.42 K/uL    Lymphs (Absolute) 2.56 1.00 - 4.80 K/uL    Monos (Absolute) 0.56 0.00 - 0.85 K/uL    " Eos (Absolute) 0.34 0.00 - 0.51 K/uL    Baso (Absolute) 0.03 0.00 - 0.12 K/uL    Immature Granulocytes (abs) 0.06 0.00 - 0.11 K/uL    NRBC (Absolute) 0.00 K/uL   BASIC METABOLIC PANEL    Collection Time: 24  3:26 PM   Result Value Ref Range    Sodium 139 135 - 145 mmol/L    Potassium 3.8 3.6 - 5.5 mmol/L    Chloride 104 96 - 112 mmol/L    Co2 19 (L) 20 - 33 mmol/L    Glucose 100 (H) 65 - 99 mg/dL    Bun 8 8 - 22 mg/dL    Creatinine 0.65 0.50 - 1.40 mg/dL    Calcium 9.6 8.5 - 10.5 mg/dL    Anion Gap 16.0 7.0 - 16.0   HCG QUAL SERUM    Collection Time: 24  3:26 PM   Result Value Ref Range    Beta-Hcg Qualitative Serum Negative Negative   ESTIMATED GFR    Collection Time: 24  3:26 PM   Result Value Ref Range    GFR (CKD-EPI) 121 >60 mL/min/1.73 m 2   EKG    Collection Time: 24  4:36 PM   Result Value Ref Range    Report       [facility]    Test Date:  2024  Pt Name:    GRECIA GUTIERREZ           Department: ER  MRN:        [id number]                      Room:       [facility]  Gender:     Female                       Technician: [id number]  :        1995                   Requested By:ABELINO CLARK  Order #:    [id number]                    Reading MD:    Measurements  Intervals                                Axis  Rate:       113                          P:          48  CA:         179                          QRS:        50  QRSD:       84                           T:          -2  QT:         316  QTc:        434    Interpretive Statements  Sinus tachycardia  Borderline T abnormalities, inferior leads  No previous ECG available for comparison        Sinus tachycardia 110.  Normal axis normal intervals.  No acute ST changes    I have independently interpreted this EKG as documented above      RADIOLOGY  I have independently interpreted the diagnostic imaging associated with this visit and am waiting the final reading from the radiologist.   My  preliminary interpretation is as follows: CT head without hemorrhage or acute abnormality    COURSE & MEDICAL DECISION MAKING    INITIAL ASSESSMENT, COURSE AND PLAN  Care Narrative: Patient presents with transient binocular vision disturbance.  There is reported right eye pain she does not have any pain.  She states that she felt like maybe the vision disturbance was more in the right eye than the left but both were certainly infected.  She never had any neurologic symptoms with it.  She is currently symptom-free aside from feeling anxious.  Her examination was reassuring.  Obtain screening laboratory data and neuroimaging.    CT head is negative.  She has no thunderclap or suggestion of aneurysm subarachnoid hemorrhage CVT .  Binocular symptoms.  Does not sound to be amaurosis fugax.  No neurosymptoms to suggest TIA.  She is not on birth control pills. no A-fib.  Young healthy female no risk factors for CVA.  Patient's laboratory data shows mildly elevated WBC count.  Certainly could be associated with stress response in this event.    Patient remained symptom-free while in the ER.  At this point the patient is suitable for discharge and follow-up with her ophthalmologist.  I have given her strict precautions to return to the ER for any weakness, numbness, headache, recurrent vision disturbance or concern.    Interventions  Medications   ibuprofen (Motrin) tablet 600 mg (600 mg Oral Given 12/1/24 2048)         DISPOSITION AND DISCUSSIONS    Escalation of care considered, and ultimately not performed: Considered additional neuroimaging and hospitalization.  Not indicated as discussed above      Follow up    Please schedule an appointment to be seen by your RA doctor as soon as possible        FINAL IMPRESSION  1.  Transient binocular vision disturbance, unspecified    This dictation was created using voice recognition software. The accuracy of the dictation is limited to the abilities of the software. I expect there  may be some errors of grammar and possibly content. The nursing notes were reviewed and certain aspects of this information were incorporated into this note.    Electronically signed by: Florentin Malagon M.D., 12/1/2024

## 2024-12-02 NOTE — PROGRESS NOTES
.Pt. Resting, no changes, 3 p's addressed, call light at bedside, poc updated  No changes, chart up for recheck